# Patient Record
Sex: MALE | Race: WHITE | Employment: FULL TIME
[De-identification: names, ages, dates, MRNs, and addresses within clinical notes are randomized per-mention and may not be internally consistent; named-entity substitution may affect disease eponyms.]

---

## 2023-03-29 ENCOUNTER — HOSPITAL ENCOUNTER (OUTPATIENT)
Facility: HOSPITAL | Age: 43
Setting detail: RECURRING SERIES
Discharge: HOME | End: 2023-04-01
Payer: COMMERCIAL

## 2023-03-29 PROCEDURE — 97162 PT EVAL MOD COMPLEX 30 MIN: CPT

## 2023-03-29 NOTE — PROGRESS NOTES
[facility] at the [facility]   [address], [address] [facility], [address] [facility]   Phone: [phone]     Fax: [phone]       Plan of Care/ Statement of Necessity for Physical Therapy Services    Patient name: Maurice Beltran Start of Care: 3/29/2023   Referral source: TONA Hampton* : 1980    Medical Diagnosis: Pain in right ankle [M25.571]       Onset Date:23    Treatment Diagnosis: right ankle pain                            Prior Hospitalization: see medical history Provider#: [id number]   Medications: Verified on Patient Summary List     Co-morbidities: PMHx/Surgical Hx:  []DM [] HTN (controlled) [] High cholesterol (controlled) [] Cancer [] Arthritis   [x]Other intermittent lower back pain  Prior Level of Function:  functionally independent, no AD, active lifestyle  Social/Recreation/Work: Work Hx: -mostly desk work, some standing  Living Situation: n/a  Recreational Activities: mountain biking/cycling 1x/week, water activities, rock climbing      [employer] of Care and following information is based on the information from the initial evaluation. Assessment/ lora information: Maurice Beltran is a 40 y.o.  yo male who [facility] to [facility] with displaced fracture of lateral malleolus of right fibula, subsequent encounter for closed fracture with routine healing. Patient is s/p right ankle open reduction with internal fixation of bimalleolar equivalent fracture and syndesmosis on 23. Patient reports having constant aching pain at right medial ankle>lateral ankle, pressure/pain on the mid to lateral foot. Pt has increased pain with standing (2-3/10),  walking, and at end range of motion  which limites ability to perform walking long distance, standing for a long period of time, dressing, bathing, and chores. Pt has relief with when things loosen up, Tylenol, and icing.  Pt rates pain __5_/10 max (in the last 48 hrs) _0__/10 min (in
Verenice Tavarez, PT , DPT, CIMT   3/29/2023    8:23 AM    Future Appointments   Date Time Provider [place] Mcgarry   3/29/2023 12:00 PM Paolo Figueroa, PT [facility]

## 2023-03-31 ENCOUNTER — HOSPITAL ENCOUNTER (OUTPATIENT)
Facility: HOSPITAL | Age: 43
Setting detail: RECURRING SERIES
End: 2023-03-31
Payer: COMMERCIAL

## 2023-03-31 PROCEDURE — 97016 VASOPNEUMATIC DEVICE THERAPY: CPT

## 2023-03-31 PROCEDURE — 97110 THERAPEUTIC EXERCISES: CPT

## 2023-03-31 PROCEDURE — 97112 NEUROMUSCULAR REEDUCATION: CPT

## 2023-03-31 PROCEDURE — 97140 MANUAL THERAPY 1/> REGIONS: CPT

## 2023-03-31 NOTE — PROGRESS NOTES
currently at rest  Current Status: same as eval     3. Pt will have . 5 grade inc LE MMT/strength to return to walking at work. Eval Status: Ankle MMT Left Right   Dorsiflexion 5     Plantarflexion  (#of heel raises) NT     Inversion 5     Eversion 5                                         Hip Left (1-5) Right (1-5)   Hip Flexion 5 5   Hip Extension 4 4-   Hip ABD 4 4   Hip ER 4+ 4+   Hip IR 4+ 4      Knee Left (1-5) Right (1-5)   Knee Flexion 5 4   Knee Extension 5 4+   Current Status: Same as Eval     4. Patient will improve lower trunk rotation by 2 inches and negative adductor drop test so that pt can have proper pelvic mechanics to perform ADLs  Eval Status: Adductor Drop Test: right: positive     left: positive; Lower trunk rotation (inches): Right: 19 left: 18  Current Status: same as Eval     5. Pt will have 5-10 deg inc in limited ankle ROM so pt can perform proper squat to pick objects off the ground.   Eval Status:   AROM (degrees) Left Right   Dorsiflexion (0-20)  (knee flexed) 14 Lacking 10   Dorsiflexion   (knee extended) 5 Lacking 12   Plantarflexion (0-50) 52 35   Inversion (0-35) 25 14 pain   Eversion (0-15) 19 10 pain   Great Toe Ext 54 30      Current status: same as Eval    PLAN  Yes  Continue plan of care  []  Upgrade activities as tolerated  []  Discharge due to :  []  Other:    Arnold Justin PT, DPT    3/31/2023    3:26 PM    Future Appointments   Date Time Provider [place] Mcgarry   4/6/2023 12:30 PM [facility] PT LUIS 2 [facility]   4/7/2023  8:30 AM Dayanaana Camera, ELISA [facility]   4/14/2023  8:30 AM Praneeth Pinto PTA [facility]   4/17/2023 11:20 AM Rebecca Correia PT [facility]   4/19/2023  8:40 AM Jesi Miranda PT [facility]   4/24/2023 12:00 PM TANIKA Kauffman [facility]   4/26/2023 12:40 PM TANIKA Kauffman [facility]

## 2023-04-14 ENCOUNTER — HOSPITAL ENCOUNTER (OUTPATIENT)
Facility: HOSPITAL | Age: 43
Setting detail: RECURRING SERIES
Discharge: HOME | End: 2023-04-17
Payer: COMMERCIAL

## 2023-04-14 PROCEDURE — 97530 THERAPEUTIC ACTIVITIES: CPT

## 2023-04-14 PROCEDURE — 97016 VASOPNEUMATIC DEVICE THERAPY: CPT

## 2023-04-14 PROCEDURE — 97110 THERAPEUTIC EXERCISES: CPT

## 2023-04-14 PROCEDURE — 97112 NEUROMUSCULAR REEDUCATION: CPT

## 2023-04-14 PROCEDURE — 97140 MANUAL THERAPY 1/> REGIONS: CPT

## 2023-04-17 ENCOUNTER — HOSPITAL ENCOUNTER (OUTPATIENT)
Facility: HOSPITAL | Age: 43
Setting detail: RECURRING SERIES
Discharge: HOME | End: 2023-04-20
Payer: COMMERCIAL

## 2023-04-17 PROCEDURE — 97140 MANUAL THERAPY 1/> REGIONS: CPT

## 2023-04-17 PROCEDURE — 97112 NEUROMUSCULAR REEDUCATION: CPT

## 2023-04-17 PROCEDURE — 97110 THERAPEUTIC EXERCISES: CPT

## 2023-04-17 NOTE — PROGRESS NOTES
PHYSICAL / OCCUPATIONAL THERAPY - DAILY TREATMENT NOTE (updated )    Patient Name: Kendra Marquez    Date: 2023    : 1980  Insurance: Payor: Ra Malone / Plan: OPTIMA PPO / Product Type: *No Product type* /      Patient  verified Yes     Visit #   Current / Total 7 24   Time   In / Out 1124 1218   Pain   In / Out 1 1   Subjective Functional Status/Changes: Reports doing better since Friday. Has been walking more over the weekend without difficulty. All walking on  without boot. Changes to:  Meds, Allergies, Med Hx, Sx Hx? If yes, update Summary List no       TREATMENT AREA =  Pain in right ankle [M25.571]    OBJECTIVE          Therapeutic Procedures: Tx Min Billable or 1:1 Min (if diff from Tx Min) Procedure, Rationale, Specifics   29  39311 Therapeutic Exercise (timed):  increase ROM, strength, coordination, balance, and proprioception to improve patient's ability to progress to PLOF and address remaining functional goals. (see flow sheet as applicable)     Details if applicable:       15  29544 Neuromuscular Re-Education (timed):  improve balance, coordination, kinesthetic sense, posture, core stability and proprioception to improve patient's ability to develop conscious control of individual muscles and awareness of position of extremities in order to progress to PLOF and address remaining functional goals. (see flow sheet as applicable)     Details if applicable:     10  00130 Manual Therapy (timed):  decrease pain, increase ROM, and increase tissue extensibility to improve patient's ability to progress to PLOF and address remaining functional goals. The manual therapy interventions were performed at a separate and distinct time from the therapeutic activities interventions .  (see flow sheet as applicable)     Details if applicable:               47  [address] Reminder: bill using total billable min of TIMED therapeutic procedures (example: do not include dry needle or estim

## 2023-04-19 ENCOUNTER — APPOINTMENT (OUTPATIENT)
Facility: HOSPITAL | Age: 43
End: 2023-04-19
Payer: COMMERCIAL

## 2023-04-19 ENCOUNTER — HOSPITAL ENCOUNTER (OUTPATIENT)
Facility: HOSPITAL | Age: 43
Setting detail: RECURRING SERIES
Discharge: HOME | End: 2023-04-22
Payer: COMMERCIAL

## 2023-04-19 PROCEDURE — 97530 THERAPEUTIC ACTIVITIES: CPT

## 2023-04-19 PROCEDURE — 97112 NEUROMUSCULAR REEDUCATION: CPT

## 2023-04-19 PROCEDURE — 97110 THERAPEUTIC EXERCISES: CPT

## 2023-04-19 PROCEDURE — 97016 VASOPNEUMATIC DEVICE THERAPY: CPT

## 2023-04-19 PROCEDURE — 97140 MANUAL THERAPY 1/> REGIONS: CPT

## 2023-04-19 NOTE — PROGRESS NOTES
Dorsiflexion (0-20)  (knee flexed) 14 Lacking 10   Dorsiflexion   (knee extended) 5 Lacking 12   Plantarflexion (0-50) 52 35   Inversion (0-35) 25 14 pain   Eversion (0-15) 19 10 pain   Great Toe Ext 54 30      Current: 4/19/23 Ankle DF: in longsit: AAROM: 2 degrees      PLAN  Yes  Continue plan of care  []  Upgrade activities as tolerated  []  Discharge due to :  []  Other:    Denise Swift PT, DPT, CIMT    4/19/2023    7:19 AM    Future Appointments   Date Time Provider [place] Mcgarry   4/19/2023  8:40 AM TANIKA Dale [facility]   4/24/2023 12:00 PM TANIKA Dale [facility]   4/26/2023 12:40 PM TANIKA Dale [facility]

## 2023-04-24 ENCOUNTER — HOSPITAL ENCOUNTER (OUTPATIENT)
Facility: HOSPITAL | Age: 43
Setting detail: RECURRING SERIES
Discharge: HOME | End: 2023-04-27
Payer: COMMERCIAL

## 2023-04-24 PROCEDURE — 97110 THERAPEUTIC EXERCISES: CPT

## 2023-04-24 PROCEDURE — 97112 NEUROMUSCULAR REEDUCATION: CPT

## 2023-04-24 PROCEDURE — 97140 MANUAL THERAPY 1/> REGIONS: CPT

## 2023-04-24 PROCEDURE — 97530 THERAPEUTIC ACTIVITIES: CPT

## 2023-04-24 PROCEDURE — 97016 VASOPNEUMATIC DEVICE THERAPY: CPT

## 2023-04-24 NOTE — PROGRESS NOTES
PHYSICAL / OCCUPATIONAL THERAPY - DAILY TREATMENT NOTE (updated )    Patient Name: Jaimee Clement    Date: 2023    : 1980  Insurance: Payor: Reginaldo Patel / Plan: OPTIMA PPO / Product Type: *No Product type* /      Patient  verified Yes     Visit #   Current / Total 9 24   Time   In / Out 12:01 1:15    Pain   In / Out 1/10 1/10   Subjective Functional Status/Changes: Report she was active this weekend but a little tired this morning probably because he didn't go through full stretching this morning. Reports that he did a couple of [place] rides. Changes to:  Meds, Allergies, Med Hx, Sx Hx? If yes, update Summary List no       TREATMENT AREA =  Pain in right ankle [M25.571]    OBJECTIVE    Modalities Rationale:     decrease edema, decrease inflammation, and decrease pain to improve patient's ability to progress to PLOF and address remaining functional goals. min [] Estim Unattended, type/location:                                      []  w/ice    []  w/heat    min [] Estim Attended, type/location:                                     []  w/US     []  w/ice    []  w/heat    []  TENS insruct      min []  Mechanical Traction: type/lbs                   []  pro   []  sup   []  int   []  cont    []  before manual    []  after manual    min []  Ultrasound, settings/location:      min []  Iontophoresis w/ dexamethasone, location:                                               []  take home patch       []  in clinic        min  unbilled []  Ice     []  Heat    location/position:     min []  Paraffin,  details:    10 min [x]  Vasopneumatic Device, press/temp: Right ankle, medium pressure, LE elevated    min []  Holli Goode / Marky Krystin:     If using vaso (only need to measure limb vaso being performed on)      pre-treatment girth : 56.6 cm      post-treatment girth : 56 cm      measured at (landmark location) : figure 8      min []  Other:    Skin assessment post-treatment (if applicable):    []  intact    []

## 2023-04-26 ENCOUNTER — HOSPITAL ENCOUNTER (OUTPATIENT)
Facility: HOSPITAL | Age: 43
Setting detail: RECURRING SERIES
Discharge: HOME | End: 2023-04-29
Payer: COMMERCIAL

## 2023-04-26 PROCEDURE — 97162 PT EVAL MOD COMPLEX 30 MIN: CPT

## 2023-04-26 PROCEDURE — 97110 THERAPEUTIC EXERCISES: CPT

## 2023-04-26 PROCEDURE — 97112 NEUROMUSCULAR REEDUCATION: CPT

## 2023-04-26 PROCEDURE — 97530 THERAPEUTIC ACTIVITIES: CPT

## 2023-04-26 NOTE — PROGRESS NOTES
PHYSICAL / OCCUPATIONAL THERAPY - DAILY TREATMENT NOTE (updated )    Patient Name: Manuel Keenan    Date: 2023    : 1980  Insurance: Payor: Rekha Duarte / Plan: OPTIMA PPO / Product Type: *No Product type* /      Patient  verified Yes     Visit #   Current / Total 10 24   Time   In / Out 12:43 1:28   Pain   In / Out 0 0   Subjective Functional Status/Changes: Reports that the worst pain in the last 48 hrs, 3/10. Reports he was sore after last therapy session. 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 Reports that he can go down stairs reciprocally. Report she can walk a couple of miles. Changes to:  Meds, Allergies, Med Hx, Sx Hx? If yes, update Summary List no       TREATMENT AREA =  Pain in right ankle [M25.571]    OBJECTIVE      Therapeutic Procedures: Tx Min Billable or 1:1 Min (if diff from Tx Min) Procedure, Rationale, Specifics   14 8 [id number] Therapeutic Exercise (timed):  increase ROM, strength, coordination, balance, and proprioception to improve patient's ability to progress to PLOF and address remaining functional goals. (see flow sheet as applicable)     Details if applicable:       11  08774 Therapeutic Activity (timed):  use of dynamic activities replicating functional movements to increase ROM, strength, coordination, balance, and proprioception in order to improve patient's ability to progress to PLOF and address remaining functional goals.   (see flow sheet as applicable)     Details if applicable:  Educated pt on gait training including performing  PPT, ananth quads at heel strike, performing heel strike, pushing off great toe during toe off, reciprocal arm swing, feeling left inner heel during heel strike     20  60658 Neuromuscular Re-Education (timed):  improve balance, coordination, kinesthetic sense, posture, core stability and proprioception to improve patient's ability to develop conscious control of individual muscles and

## 2023-04-26 NOTE — PROGRESS NOTES
[facility] at the [address], [place], [address] [facility]  Phone: [phone]      Fax:  [phone]    Progress Note  Patient name: Sadie Calloway Start of Care: 3/29/2023   Referral source: TONA Flores* : 1980               Medical Diagnosis: Pain in right ankle [M25.571]        Onset Date:23               Treatment Diagnosis: right ankle pain                            Prior Hospitalization: see medical history Provider#: [id number]   Medications: Verified on Patient Summary List      Co-morbidities: PMHx/Surgical Hx:  []DM [] HTN (controlled) [] High cholesterol (controlled) [] Cancer [] Arthritis   [x]Other intermittent lower back pain  Prior Level of Function:  functionally independent, no AD, active lifestyle  Social/Recreation/Work: Work Hx: -mostly desk work, some standing  Living Situation: n/a  Recreational Activities: mountain biking/cycling 1x/week, water activities, rock climbing      Visits from [place] of Care: 10    Missed Visits:     Updated Goals/Measure of Progress: To be achieved in 8 more weeks:    Short Term Goals: To be accomplished in 2 weeks:  Patient will be independent and compliant with HEP to progress toward goals and restore functional mobility. Eval Status: Provided pt with HEP and pt appeared to understand. Current Status: 23  reports compliance, needs updateing     Patient will improve pain in at the right foot/ankle no greater than 4/10  to improve tolerance with ADLs  Eval Status: Pt rates pain __5_/10 max (in the last 48 hrs) _0__/10 min (in the last 48 hrs) _0-1__/10 currently at rest  Current 23  pt reporting worst pain 3/10- goal met     Long Term Goals: To be accomplished in 8 weeks  Patient will improve FOTO score to 74 points to improve functional tolerance for functional activities. Eval Status: FOTO 49  Current Status: 23 NA will perform next visit     2.    Pt will reports

## 2023-04-28 ENCOUNTER — HOSPITAL ENCOUNTER (OUTPATIENT)
Facility: HOSPITAL | Age: 43
Setting detail: RECURRING SERIES
Discharge: HOME | End: 2023-05-01
Payer: COMMERCIAL

## 2023-04-28 PROCEDURE — 97140 MANUAL THERAPY 1/> REGIONS: CPT

## 2023-04-28 PROCEDURE — 97112 NEUROMUSCULAR REEDUCATION: CPT

## 2023-04-28 PROCEDURE — 97110 THERAPEUTIC EXERCISES: CPT

## 2023-04-28 PROCEDURE — 97016 VASOPNEUMATIC DEVICE THERAPY: CPT

## 2023-05-08 ENCOUNTER — TELEPHONE (OUTPATIENT)
Facility: HOSPITAL | Age: 43
End: 2023-05-08

## 2023-05-15 ENCOUNTER — TELEPHONE (OUTPATIENT)
Facility: HOSPITAL | Age: 43
End: 2023-05-15

## 2025-06-13 ENCOUNTER — CLINICAL DOCUMENTATION (OUTPATIENT)
Age: 45
End: 2025-06-13

## 2025-06-13 NOTE — PROGRESS NOTES
6/13/25 0909: Records received from Geovanni Parmar MD at [facility]. Dx: NAFLD. Routine appt. Records placed in Patricia's box